# Patient Record
Sex: FEMALE | URBAN - METROPOLITAN AREA
[De-identification: names, ages, dates, MRNs, and addresses within clinical notes are randomized per-mention and may not be internally consistent; named-entity substitution may affect disease eponyms.]

---

## 2020-07-25 ENCOUNTER — TELEPHONE ENCOUNTER (OUTPATIENT)
Dept: URBAN - METROPOLITAN AREA CLINIC 13 | Facility: CLINIC | Age: 40
End: 2020-07-25

## 2020-07-26 ENCOUNTER — TELEPHONE ENCOUNTER (OUTPATIENT)
Dept: URBAN - METROPOLITAN AREA CLINIC 13 | Facility: CLINIC | Age: 40
End: 2020-07-26

## 2020-11-12 ENCOUNTER — OFFICE VISIT (OUTPATIENT)
Dept: URBAN - METROPOLITAN AREA CLINIC 113 | Facility: CLINIC | Age: 40
End: 2020-11-12

## 2020-11-12 NOTE — HPI-TODAY'S VISIT:
Ms. Billy is a 40-year-old female presenting for evaluation of abdominal pain. Labs 10/30/2020 : Normal CBC, unremarkable BMP, normal LFTs, normal lipase. CT abdomen and pelvis without contrast 10/30/2020 : Fluid throughout the nondilated small bowel nonspecific finding, otherwise no acute abnormality in the abdomen or pelvis, small fat filled umbilical hernia.